# Patient Record
Sex: FEMALE | Race: WHITE | NOT HISPANIC OR LATINO | Employment: FULL TIME | URBAN - METROPOLITAN AREA
[De-identification: names, ages, dates, MRNs, and addresses within clinical notes are randomized per-mention and may not be internally consistent; named-entity substitution may affect disease eponyms.]

---

## 2018-06-18 ENCOUNTER — OFFICE VISIT (OUTPATIENT)
Dept: ORTHOPEDIC SURGERY | Facility: CLINIC | Age: 58
End: 2018-06-18

## 2018-06-18 VITALS — BODY MASS INDEX: 25.83 KG/M2 | HEART RATE: 76 BPM | WEIGHT: 160.72 LBS | HEIGHT: 66 IN | OXYGEN SATURATION: 98 %

## 2018-06-18 DIAGNOSIS — R52 PAIN: Primary | ICD-10-CM

## 2018-06-18 DIAGNOSIS — M25.571 CHRONIC PAIN OF RIGHT ANKLE: ICD-10-CM

## 2018-06-18 DIAGNOSIS — G89.29 CHRONIC PAIN OF RIGHT ANKLE: ICD-10-CM

## 2018-06-18 PROCEDURE — 99204 OFFICE O/P NEW MOD 45 MIN: CPT | Performed by: ORTHOPAEDIC SURGERY

## 2018-06-18 RX ORDER — CETIRIZINE HYDROCHLORIDE 10 MG/1
10 TABLET ORAL DAILY
COMMUNITY

## 2018-06-18 RX ORDER — ERGOCALCIFEROL (VITAMIN D2) 10 MCG
400 TABLET ORAL DAILY
COMMUNITY

## 2018-06-18 RX ORDER — MELOXICAM 15 MG/1
15 TABLET ORAL DAILY
COMMUNITY

## 2018-06-18 RX ORDER — SERTRALINE HYDROCHLORIDE 25 MG/1
25 TABLET, FILM COATED ORAL DAILY
COMMUNITY

## 2018-06-18 RX ORDER — MULTIVIT WITH MINERALS/LUTEIN
250 TABLET ORAL DAILY
COMMUNITY

## 2018-06-18 RX ORDER — ELETRIPTAN HYDROBROMIDE 20 MG/1
20 TABLET, FILM COATED ORAL ONCE AS NEEDED
COMMUNITY
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
## 2018-06-22 ENCOUNTER — HOSPITAL ENCOUNTER (OUTPATIENT)
Dept: MRI IMAGING | Facility: HOSPITAL | Age: 58
Discharge: HOME | End: 2018-06-22
Attending: ORTHOPAEDIC SURGERY | Admitting: ORTHOPAEDIC SURGERY

## 2018-06-22 DIAGNOSIS — G89.29 CHRONIC PAIN OF RIGHT ANKLE: ICD-10-CM

## 2018-06-22 DIAGNOSIS — M25.571 CHRONIC PAIN OF RIGHT ANKLE: ICD-10-CM

## 2018-06-22 DIAGNOSIS — R52 PAIN: ICD-10-CM

## 2018-06-22 PROCEDURE — 73721 MRI JNT OF LWR EXTRE W/O DYE: CPT

## 2018-07-09 ENCOUNTER — OFFICE VISIT (OUTPATIENT)
Dept: ORTHOPEDIC SURGERY | Facility: CLINIC | Age: 58
End: 2018-07-09

## 2018-07-09 VITALS — HEIGHT: 65 IN | BODY MASS INDEX: 27 KG/M2 | HEART RATE: 89 BPM | WEIGHT: 162.04 LBS | OXYGEN SATURATION: 98 %

## 2018-07-09 DIAGNOSIS — G89.29 CHRONIC PAIN OF RIGHT ANKLE: Primary | ICD-10-CM

## 2018-07-09 DIAGNOSIS — M25.571 CHRONIC PAIN OF RIGHT ANKLE: Primary | ICD-10-CM

## 2018-07-09 PROCEDURE — 99213 OFFICE O/P EST LOW 20 MIN: CPT | Performed by: ORTHOPAEDIC SURGERY

## 2018-07-09 NOTE — PROGRESS NOTES
ESTABLISHED PATIENT    Patient: Ina Tanner  : 1960    Primary Care Provider: Miladis Botello MD    Requesting Provider: As above    Follow-up of the Right Ankle (After MRI 2018)      History    Chief Complaint: Right ankle pain    History of Present Illness: She returns for follow-up of the MRI of her right ankle.  I looked at the MRI myself, I do not find any abnormality to account for her anterolateral pain.  The ligaments are intact, there is no signs of a meniscoid type lesion.  She does have some thickening of the peroneus brevis, but I think that is related to the prior surgery, and that is not at all where she is painful.  She reports reproducible pain at the anterolateral corner of the talus.    Current Outpatient Prescriptions on File Prior to Visit   Medication Sig Dispense Refill   • cetirizine (zyrTEC) 10 MG tablet Take 10 mg by mouth Daily.     • eletriptan (RELPAX) 20 MG tablet Take 20 mg by mouth 1 (One) Time As Needed for Migraine. may repeat in 2 hours if necessary     • estradiol (CLIMARA) 0.025 MG/24HR patch Place 1 patch on the skin 1 (One) Time Per Week.     • meloxicam (MOBIC) 15 MG tablet Take 15 mg by mouth Daily.     • sertraline (ZOLOFT) 25 MG tablet Take 25 mg by mouth Daily.     • vitamin C (ASCORBIC ACID) 250 MG tablet Take 250 mg by mouth Daily.     • Vitamin D, Cholecalciferol, (CHOLECALCIFEROL) 400 units tablet Take 400 Units by mouth Daily.       No current facility-administered medications on file prior to visit.       Allergies   Allergen Reactions   • Curare [Tubocurarine] Hives   • Promethazine Other (See Comments)     Paraital       No past medical history on file.  Past Surgical History:   Procedure Laterality Date   • BACK SURGERY      3 back surgeries    • CERVICAL FUSION     • CHOLECYSTECTOMY     • ELBOW ARTHROPLASTY Right      Family History   Problem Relation Age of Onset   • Cancer Mother    • Diabetes Mother    • Hypertension Mother    • Heart attack  "Mother       Social History     Social History   • Marital status:      Spouse name: N/A   • Number of children: N/A   • Years of education: N/A     Occupational History   • Not on file.     Social History Main Topics   • Smoking status: Former Smoker   • Smokeless tobacco: Never Used   • Alcohol use 0.6 oz/week     1 Glasses of wine per week   • Drug use: No   • Sexual activity: Defer     Other Topics Concern   • Not on file     Social History Narrative   • No narrative on file        Review of Systems   Constitutional: Negative.    HENT: Negative.    Eyes: Negative.    Respiratory: Negative.    Cardiovascular: Negative.    Gastrointestinal: Negative.    Endocrine: Negative.    Genitourinary: Negative.    Musculoskeletal: Positive for arthralgias and gait problem.   Skin: Negative.    Allergic/Immunologic: Negative.    Hematological: Negative.    Psychiatric/Behavioral: Negative.        The following portions of the patient's history were reviewed and updated as appropriate: allergies, current medications, past family history, past medical history, past social history, past surgical history and problem list.    Physical Exam:   Pulse 89   Ht 165.1 cm (65\")   Wt 73.5 kg (162 lb 0.6 oz)   SpO2 98%   BMI 26.96 kg/m²     She is tender at the anterolateral corner of the right ankle over the talus, nontender over the peroneals, normal range of motion, no instability     Medical Decision Making    Data Review:   reviewed radiology images and reviewed radiology results    Assessment/Plan/Diagnosis/Treatment Options:   1. Chronic pain of right ankle  Unfortunately I do not see any surgical indications here.  I explained her that I generally do not pursue ankle arthroscopy without a specific indication.  There is nothing that looks like a meniscoid lesion, and even though there are changes in the peroneals, she is not at all tender there.  Her symptoms are reproducibly in the anterolateral ankle, but I do not see " anything to operate on.  She reports she will probably have an occasional injection in her hometown, I think that's reasonable.  Follow-up as needed.